# Patient Record
Sex: MALE | Race: WHITE | Employment: FULL TIME | URBAN - METROPOLITAN AREA
[De-identification: names, ages, dates, MRNs, and addresses within clinical notes are randomized per-mention and may not be internally consistent; named-entity substitution may affect disease eponyms.]

---

## 2019-03-03 ENCOUNTER — HOSPITAL ENCOUNTER (EMERGENCY)
Facility: HOSPITAL | Age: 54
Discharge: HOME | End: 2019-03-03
Attending: EMERGENCY MEDICINE
Payer: COMMERCIAL

## 2019-03-03 ENCOUNTER — APPOINTMENT (EMERGENCY)
Dept: CT IMAGING | Facility: HOSPITAL | Age: 54
End: 2019-03-03
Payer: COMMERCIAL

## 2019-03-03 VITALS
BODY MASS INDEX: 23.7 KG/M2 | TEMPERATURE: 98.4 F | RESPIRATION RATE: 16 BRPM | SYSTOLIC BLOOD PRESSURE: 125 MMHG | DIASTOLIC BLOOD PRESSURE: 76 MMHG | OXYGEN SATURATION: 99 % | WEIGHT: 160 LBS | HEIGHT: 69 IN | HEART RATE: 78 BPM

## 2019-03-03 DIAGNOSIS — S00.83XA CONTUSION OF FACE, INITIAL ENCOUNTER: Primary | ICD-10-CM

## 2019-03-03 PROCEDURE — 70450 CT HEAD/BRAIN W/O DYE: CPT

## 2019-03-03 PROCEDURE — 99284 EMERGENCY DEPT VISIT MOD MDM: CPT

## 2019-03-03 RX ORDER — VERAPAMIL HYDROCHLORIDE 40 MG/1
TABLET ORAL DAILY
COMMUNITY
Start: 2012-01-30 | End: 2019-03-05

## 2019-03-03 RX ORDER — NAPROXEN 500 MG/1
500 TABLET ORAL 2 TIMES DAILY PRN
Qty: 30 TABLET | Refills: 0 | Status: SHIPPED | OUTPATIENT
Start: 2019-03-03 | End: 2019-03-04

## 2019-03-03 RX ORDER — GINSENG 100 MG
1 CAPSULE ORAL ONCE
Status: COMPLETED | OUTPATIENT
Start: 2019-03-03 | End: 2019-03-03

## 2019-03-03 RX ORDER — ESOMEPRAZOLE MAGNESIUM 40 MG/1
CAPSULE, DELAYED RELEASE ORAL DAILY
COMMUNITY
Start: 2012-01-30 | End: 2019-03-04

## 2019-03-03 RX ORDER — RIZATRIPTAN BENZOATE 10 MG/1
TABLET ORAL
COMMUNITY
Start: 2005-08-23

## 2019-03-03 RX ADMIN — BACITRACIN ZINC 1 SMALL APPLICATION: 500 OINTMENT TOPICAL at 11:12
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
## 2019-03-04 ENCOUNTER — OFFICE VISIT (OUTPATIENT)
Dept: URGENT CARE | Facility: CLINIC | Age: 54
End: 2019-03-04
Payer: COMMERCIAL

## 2019-03-04 VITALS
BODY MASS INDEX: 25.76 KG/M2 | DIASTOLIC BLOOD PRESSURE: 82 MMHG | TEMPERATURE: 98.6 F | SYSTOLIC BLOOD PRESSURE: 130 MMHG | RESPIRATION RATE: 18 BRPM | HEIGHT: 68 IN | HEART RATE: 88 BPM | OXYGEN SATURATION: 97 % | WEIGHT: 170 LBS

## 2019-03-04 DIAGNOSIS — S06.0X9A CONCUSSION WITH LOSS OF CONSCIOUSNESS, INITIAL ENCOUNTER: Primary | ICD-10-CM

## 2019-03-04 PROCEDURE — 99213 OFFICE O/P EST LOW 20 MIN: CPT | Performed by: PHYSICIAN ASSISTANT

## 2019-03-04 RX ORDER — FAMOTIDINE 40 MG/1
40 TABLET, FILM COATED ORAL DAILY
COMMUNITY
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
## 2019-03-04 NOTE — PATIENT INSTRUCTIONS
Keep appointment for tomorrow with Dr Lenoora Kong at 10:15  Continue ibuprofen or Tylenol for discomfort  Drink plenty of water and get plenty of rest   Practice brain rest by limiting TV, phone, electronics, reading, and other brain stimuli  Try to dim or lower lights when possible  Apply ice to your face for 20-30 minutes at a time, as needed for swelling and discomfort  Continue to keep your skin clean using soap and water and apply antibiotic ointment to dry skin  Begin a B-complex vitamin and a fish oil  Proceed to the ER if symptoms worsen  Concussion   WHAT YOU NEED TO KNOW:   A concussion is a mild brain injury  It is usually caused by a bump or blow to the head from a fall, a motor vehicle crash, or a sports injury  Sometimes being shaken forcefully may cause a concussion  DISCHARGE INSTRUCTIONS:   Have someone else call 911 for the following:   · Someone tries to wake you and cannot do so  · You have a seizure, increasing confusion, or a change in personality  · Your speech becomes slurred, or you have new vision problems  Return to the emergency department if:   · You have a severe headache that does not go away  · You have arm or leg weakness, numbness, or new problems with coordination  · You have blood or clear fluid coming out of the ears or nose  Contact your healthcare provider if:   · You have nausea or are vomiting  · You feel more sleepy than usual     · Your symptoms get worse  · Your symptoms last longer than 6 weeks after the injury  · You have questions or concerns about your condition or care  Medicines:   · Acetaminophen  helps to decrease pain  It is available without a doctor's order  Ask how much to take and how often to take it  Follow directions  Acetaminophen can cause liver damage if not taken correctly  · NSAIDs , such as ibuprofen, help decrease swelling and pain  NSAIDs can cause stomach bleeding or kidney problems in certain people   If you take blood thinner medicine, always ask your healthcare provider if NSAIDs are safe for you  Always read the medicine label and follow directions  · Take your medicine as directed  Contact your healthcare provider if you think your medicine is not helping or if you have side effects  Tell him or her if you are allergic to any medicine  Keep a list of the medicines, vitamins, and herbs you take  Include the amounts, and when and why you take them  Bring the list or the pill bottles to follow-up visits  Carry your medicine list with you in case of an emergency  Follow up with your healthcare provider as directed:  Write down your questions so you remember to ask them during your visits  Self-care:   · Rest  from physical and mental activities as directed  Mental activities are those that require thinking, concentration, and attention  You will need to rest until your symptoms are gone  Rest will allow you to recover from your concussion  Ask your healthcare provider when you can return to work and other daily activities  · Have someone stay with you for the first 24 hours after your injury  Your healthcare provider should be contacted if your symptoms get worse, or you develop new symptoms  · Do not participate in sports and physical activities until your healthcare provider says it is okay  They could make your symptoms worse or lead to another concussion  Your healthcare provider will tell you when it is okay for you to return to sports or physical activities  Prevent another concussion:   · Wear protective sports equipment that fit properly  Helmets help decrease your risk of a serious brain injury  Talk to your healthcare provider about ways you can decrease your risk for a concussion if you play sports  · Wear your seat belt  every time you travel  This helps to decrease your risk of a head injury if you are in a car accident    © 2017 [employer] Information is for End User's use only and may not be sold, redistributed or otherwise used for commercial purposes  All illustrations and images included in CareNotes® are the copyrighted property of A [employer] M [employer] Inc  or Roque Mitchell  The above information is an  only  It is not intended as medical advice for individual conditions or treatments  Talk to your doctor, nurse or pharmacist before following any medical regimen to see if it is safe and effective for you

## 2019-03-05 VITALS
DIASTOLIC BLOOD PRESSURE: 76 MMHG | HEART RATE: 93 BPM | SYSTOLIC BLOOD PRESSURE: 138 MMHG | HEIGHT: 68 IN | BODY MASS INDEX: 23.64 KG/M2 | WEIGHT: 156 LBS

## 2019-03-05 DIAGNOSIS — S06.0X9A CONCUSSION WITH LOSS OF CONSCIOUSNESS, INITIAL ENCOUNTER: Primary | ICD-10-CM

## 2019-03-05 DIAGNOSIS — S00.81XA ABRASION OF FACE, INITIAL ENCOUNTER: ICD-10-CM

## 2019-03-05 PROCEDURE — 99203 OFFICE O/P NEW LOW 30 MIN: CPT | Performed by: ORTHOPAEDIC SURGERY

## 2019-03-05 RX ORDER — IBUPROFEN 200 MG
200 TABLET ORAL EVERY 6 HOURS PRN
COMMUNITY

## 2019-03-05 RX ORDER — ACETAMINOPHEN 325 MG/1
650 TABLET ORAL EVERY 6 HOURS PRN
COMMUNITY
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
## 2019-03-05 NOTE — LETTER
March 5, 2019     Patient: Elizabeth Lee   YOB: 1965   Date of Visit: 3/5/2019       To Whom it May Concern:    Elizabeth Lee is under my professional care  He was seen in my office on 3/5/2019  He should be out of work from 3/5/19 - 3/11/2019  If you have any questions or concerns, please don't hesitate to call           Sincerely,          Adia Welch, DO

## 2019-03-12 ENCOUNTER — OFFICE VISIT (OUTPATIENT)
Dept: OBGYN CLINIC | Facility: CLINIC | Age: 54
End: 2019-03-12
Payer: COMMERCIAL

## 2019-03-12 VITALS
SYSTOLIC BLOOD PRESSURE: 127 MMHG | HEART RATE: 84 BPM | DIASTOLIC BLOOD PRESSURE: 87 MMHG | HEIGHT: 67 IN | WEIGHT: 170 LBS | BODY MASS INDEX: 26.68 KG/M2

## 2019-03-12 DIAGNOSIS — S06.0X9D CONCUSSION WITH LOSS OF CONSCIOUSNESS, SUBSEQUENT ENCOUNTER: Primary | ICD-10-CM

## 2019-03-12 PROCEDURE — 99213 OFFICE O/P EST LOW 20 MIN: CPT | Performed by: ORTHOPAEDIC SURGERY

## 2019-03-12 NOTE — PROGRESS NOTES
Assessment/Plan:  No diagnosis found  ***    Subjective:   Emilia Paul is a 47 y o  male who presents ***      Review of Systems      Past Medical History:   Diagnosis Date    GERD (gastroesophageal reflux disease)     Migraine        Past Surgical History:   Procedure Laterality Date    APPENDECTOMY      HERNIA REPAIR Right     inguinal       History reviewed  No pertinent family history      Social History     Occupational History    Not on file   Tobacco Use    Smoking status: Former Smoker     Types: Cigarettes    Smokeless tobacco: Never Used   Substance and Sexual Activity    Alcohol use: Not Currently    Drug use: Never    Sexual activity: Not on file         Current Outpatient Medications:     acetaminophen (TYLENOL) 325 mg tablet, Take 650 mg by mouth every 6 (six) hours as needed for mild pain, Disp: , Rfl:     famotidine (PEPCID) 40 MG tablet, Take 40 mg by mouth daily, Disp: , Rfl:     ibuprofen (MOTRIN) 200 mg tablet, Take 200 mg by mouth every 6 (six) hours as needed for mild pain, Disp: , Rfl:     rizatriptan (MAXALT) 10 MG tablet, Take by mouth, Disp: , Rfl:     No Known Allergies    Objective:  Vitals:    03/12/19 1742   BP: 127/87   Pulse: 84       Ortho Exam    Physical Exam    I have personally reviewed pertinent films in PACS and my interpretation is as follows:  ***

## 2019-03-13 NOTE — PROGRESS NOTES
Assessment / Plan     1  Concussion with loss of consciousness, subsequent encounter         Henri Hernández has improving symptoms following his concussion  He seems only have a headache today and was feeling much better yesterday  Only being 1 week out from his injury, he seems to be doing very well and should have a short time table recovery  I advised him to gradually increase his exercise once he has 2 days of feeling completely normal   I advised against returning to skiing any time soon until he can ensure he is feeling much much better  It is dangerous to ski if he is off balance or having any visual symptoms whatsoever  He verbalized understanding of this  He should improve over the next week if he has ongoing symptoms he will follow up in the office only if needed  Subjective       Patient ID:  Lynnea Schwab is a 47 y o  male  who presents for follow-up for concussion suffered over 1 week ago after a ski injury  He did have elevated symptoms of a concussion last week  We advised to gradually return to work and other activity as tolerated  He returns today stating that he feels much better  He felt normal yesterday but does have a slight headache today after returning to work  He denies any fatigue or visual symptoms  He is eager to return to physical activity in skiing  Change in Symptoms:  Better  Explain:  Improved symptoms, only 1 symptom today  Back to School/work:  Back to work  Current Physical Activity:  None  Recent Grades / Tests: n/a  Subsequent Injuries:  No  Do symptoms worsen with Physical Activity? N/A  Do symptoms worsen with Cognitive Activity? Yes  Overall Rating:  What percent is this person back to normal?  Patient 90 %  Response to Meds:  N/A    Review of Systems   Constitutional: Negative for chills, fever and unexpected weight change  HENT: Negative for hearing loss, nosebleeds and sore throat  Eyes: Negative for pain, redness and visual disturbance  Respiratory: Negative for cough, shortness of breath and wheezing  Cardiovascular: Negative for chest pain, palpitations and leg swelling  Gastrointestinal: Negative for abdominal pain, nausea and vomiting  Endocrine: Negative for polyphagia and polyuria  Genitourinary: Negative for dysuria and hematuria  Musculoskeletal:        See HPI   Skin: Negative for rash and wound  Neurological: Positive for headaches  Negative for dizziness and numbness  Psychiatric/Behavioral: Negative for decreased concentration and suicidal ideas  The patient is not nervous/anxious  Past Medical History:   Diagnosis Date    GERD (gastroesophageal reflux disease)     Migraine        Past Surgical History:   Procedure Laterality Date    APPENDECTOMY      HERNIA REPAIR Right     inguinal       History reviewed  No pertinent family history  Social History     Occupational History    Not on file   Tobacco Use    Smoking status: Former Smoker     Types: Cigarettes    Smokeless tobacco: Never Used   Substance and Sexual Activity    Alcohol use: Not Currently    Drug use: Never    Sexual activity: Not on file         Current Outpatient Medications:     acetaminophen (TYLENOL) 325 mg tablet, Take 650 mg by mouth every 6 (six) hours as needed for mild pain, Disp: , Rfl:     famotidine (PEPCID) 40 MG tablet, Take 40 mg by mouth daily, Disp: , Rfl:     ibuprofen (MOTRIN) 200 mg tablet, Take 200 mg by mouth every 6 (six) hours as needed for mild pain, Disp: , Rfl:     rizatriptan (MAXALT) 10 MG tablet, Take by mouth, Disp: , Rfl:     No Known Allergies          Physical Exam     /87   Pulse 84   Ht 5' 7" (1 702 m)   Wt 77 1 kg (170 lb)   BMI 26 63 kg/m²     Objective:    Ortho Exam    Physical Exam   Constitutional: He is oriented to person, place, and time  He appears well-developed and well-nourished  HENT:   Head: Normocephalic and atraumatic     Eyes: Pupils are equal, round, and reactive to light  Conjunctivae are normal    Neck: Normal range of motion  Neck supple  Cardiovascular: Normal rate and intact distal pulses  Pulmonary/Chest: Effort normal  No respiratory distress  Musculoskeletal:   As noted in HPI   Neurological: He is alert and oriented to person, place, and time  Skin: Skin is warm and dry  Psychiatric: He has a normal mood and affect  His behavior is normal    Vitals reviewed        AAOX3:  Yes   Mood and Affect:  Normal  HEENT:   Lacerations:  No   Bruising:  No   PEERLA:  Yes   EOMI: Yes   Fracture/Trauma:  No  Neuro:   CNII - XII Intact:  Yes   Examination of Coordination:    Limited Balance:   Yes    Romberg:  normal    Forward Tandem Gait:  normal    Backward Tandem Gait:   normal    Eyes Close Tandem Gait:   abnormal        FTN: normal    Diadochokinesia: normal      Vestibular Ocular:     Gaze stability:    Nystagmus: no    Saccades: no/horizontal/vertical: normal    Vestibular Motion: normal    Convergence:  3cm

## 2021-12-13 ENCOUNTER — TELEPHONE (OUTPATIENT)
Dept: FAMILY MEDICINE CLINIC | Facility: CLINIC | Age: 56
End: 2021-12-13

## 2022-04-20 ENCOUNTER — OFFICE VISIT (OUTPATIENT)
Dept: URGENT CARE | Facility: CLINIC | Age: 57
End: 2022-04-20
Payer: COMMERCIAL

## 2022-04-20 VITALS — HEART RATE: 90 BPM | TEMPERATURE: 99.4 F | OXYGEN SATURATION: 98 %

## 2022-04-20 DIAGNOSIS — J06.9 UPPER RESPIRATORY TRACT INFECTION, UNSPECIFIED TYPE: Primary | ICD-10-CM

## 2022-04-20 DIAGNOSIS — Z20.828 CONTACT WITH AND (SUSPECTED) EXPOSURE TO OTHER VIRAL COMMUNICABLE DISEASES: ICD-10-CM

## 2022-04-20 PROCEDURE — 87636 SARSCOV2 & INF A&B AMP PRB: CPT | Performed by: PHYSICIAN ASSISTANT

## 2022-04-20 PROCEDURE — 99203 OFFICE O/P NEW LOW 30 MIN: CPT | Performed by: PHYSICIAN ASSISTANT

## 2022-04-21 ENCOUNTER — TELEPHONE (OUTPATIENT)
Dept: URGENT CARE | Facility: CLINIC | Age: 57
End: 2022-04-21

## 2022-04-21 DIAGNOSIS — J10.1 INFLUENZA A: Primary | ICD-10-CM

## 2022-04-21 LAB
FLUAV RNA RESP QL NAA+PROBE: POSITIVE
FLUBV RNA RESP QL NAA+PROBE: NEGATIVE
SARS-COV-2 RNA RESP QL NAA+PROBE: NEGATIVE

## 2022-04-21 RX ORDER — OSELTAMIVIR PHOSPHATE 75 MG/1
75 CAPSULE ORAL 2 TIMES DAILY
Qty: 10 CAPSULE | Refills: 0 | Status: SHIPPED | OUTPATIENT
Start: 2022-04-21 | End: 2022-04-26

## 2022-04-21 NOTE — PROGRESS NOTES
[facility]        NAME: Hui Maynard is a 62 y o  male  : 1965    MRN: [id number]  DATE: 2022  TIME: 8:20 PM    Assessment and Plan   Upper respiratory tract infection, unspecified type [J06 9]  1  Upper respiratory tract infection, unspecified type  Cov/Flu-Collected at [facility]   2  Contact with and (suspected) exposure to other viral communicable diseases  Cov/Flu-Collected at [facility] or [facility]     Continue Mucinex  Discussed viral nature of illness and lack of role for antibiotics at point  Discussed importance of maintaining adequate hydration advised to quarantine pending test results  Discussed strict return to care precautions as well as red flag symptoms which should prompt immediate ED referral  Pt verbalized understanding and is in agreement with plan  Please follow up with your primary care provider within the next week  Please remember that your visit today was with an urgent care provider and should not replace follow up with your primary care provider for chronic medical issues or annual physicals  Patient Instructions       Follow up with PCP in 3-5 days  Proceed to  ER if symptoms worsen  Chief Complaint     Chief Complaint   Patient presents with    Cold Like Symptoms     cough, congestion, fever 102F         History of Present Illness       Hui Maynard is a(n) 62 y o  male presenting with URI symptoms x 1 days  Past medical history: GERD, migraines  Congestion: yes  Sore throat: yes  Cough: yes  Sputum production: no  Fever: yes, tmax 102F yesterday  Body aches: yes  Loss of smell/taste: no  GI symptoms: no  Known sick contacts: no  OTC meds tried: mucinex with some improvement  Vaccinated against COVID19: no        Review of Systems   Review of Systems   Constitutional: Positive for chills, fatigue and fever  Negative for diaphoresis  HENT: Positive for congestion, postnasal drip and sore throat   Negative for ear pain, rhinorrhea, sinus pain, sneezing and trouble swallowing  Eyes: Negative for pain and redness  Respiratory: Positive for cough  Negative for chest tightness, shortness of breath and wheezing  Cardiovascular: Negative for chest pain and leg swelling  Gastrointestinal: Negative for diarrhea, nausea and vomiting  Musculoskeletal: Positive for myalgias  Neurological: Positive for headaches  Negative for dizziness and weakness  Current Medications       Current Outpatient Medications:     famotidine (PEPCID) 40 MG tablet, Take 40 mg by mouth daily, Disp: , Rfl:     rizatriptan (MAXALT) 10 MG tablet, Take by mouth, Disp: , Rfl:     acetaminophen (TYLENOL) 325 mg tablet, Take 650 mg by mouth every 6 (six) hours as needed for mild pain, Disp: , Rfl:     ibuprofen (MOTRIN) 200 mg tablet, Take 200 mg by mouth every 6 (six) hours as needed for mild pain, Disp: , Rfl:     Current Allergies     Allergies as of 04/20/2022    (No Known Allergies)            The following portions of the patient's history were reviewed and updated as appropriate: allergies, current medications, past family history, past medical history, past social history, past surgical history and problem list      Past Medical History:   Diagnosis Date    GERD (gastroesophageal reflux disease)     Migraine        Past Surgical History:   Procedure Laterality Date    APPENDECTOMY      HERNIA REPAIR Right     inguinal       History reviewed  No pertinent family history  Medications have been verified  Objective   Pulse 90   Temp 99 4 °F (37 4 °C)   SpO2 98%        Physical Exam     Physical Exam  Vitals and nursing note reviewed  Constitutional:       General: He is not in acute distress  Appearance: Normal appearance  He is not toxic-appearing  HENT:      Head: Normocephalic and atraumatic        Right Ear: Tympanic membrane, ear canal and external ear normal       Left Ear: Tympanic membrane, ear canal and external ear normal       Nose: Congestion present  Mouth/Throat:      Mouth: Mucous membranes are moist       Pharynx: Oropharynx is clear  No oropharyngeal exudate or posterior oropharyngeal erythema  Eyes:      Conjunctiva/sclera: Conjunctivae normal       Pupils: Pupils are equal, round, and reactive to light  Cardiovascular:      Rate and Rhythm: Normal rate and regular rhythm  Heart sounds: Normal heart sounds  Pulmonary:      Effort: Pulmonary effort is normal  No respiratory distress  Breath sounds: Normal breath sounds  No wheezing, rhonchi or rales  Abdominal:      General: Abdomen is flat  Palpations: Abdomen is soft  Musculoskeletal:      Cervical back: Normal range of motion and neck supple  Skin:     General: Skin is warm and dry  Capillary Refill: Capillary refill takes less than 2 seconds  Neurological:      Mental Status: He is alert and oriented to person, place, and time     Psychiatric:         Behavior: Behavior normal

## 2022-04-21 NOTE — TELEPHONE ENCOUNTER
Pt tested positive for influenza A - less than 48 hrs since symptom onset, still qualifies for Tamiflu  Pt states he is feeling a little better but still harsh cough  Will call in rx now  Discussed rtc precautions with pt

## 2024-04-21 ENCOUNTER — OFFICE VISIT (OUTPATIENT)
Dept: URGENT CARE | Facility: CLINIC | Age: 59
End: 2024-04-21
Payer: COMMERCIAL

## 2024-04-21 ENCOUNTER — APPOINTMENT (OUTPATIENT)
Dept: RADIOLOGY | Facility: CLINIC | Age: 59
End: 2024-04-21
Payer: COMMERCIAL

## 2024-04-21 VITALS
TEMPERATURE: 98.2 F | OXYGEN SATURATION: 99 % | DIASTOLIC BLOOD PRESSURE: 86 MMHG | BODY MASS INDEX: 25.03 KG/M2 | WEIGHT: 169 LBS | RESPIRATION RATE: 16 BRPM | HEIGHT: 69 IN | HEART RATE: 74 BPM | SYSTOLIC BLOOD PRESSURE: 129 MMHG

## 2024-04-21 DIAGNOSIS — M54.2 NECK PAIN: Primary | ICD-10-CM

## 2024-04-21 DIAGNOSIS — S46.812A TRAPEZIUS STRAIN, LEFT, INITIAL ENCOUNTER: ICD-10-CM

## 2024-04-21 PROCEDURE — 72050 X-RAY EXAM NECK SPINE 4/5VWS: CPT

## 2024-04-21 PROCEDURE — 99203 OFFICE O/P NEW LOW 30 MIN: CPT | Performed by: PHYSICIAN ASSISTANT

## 2024-04-21 PROCEDURE — 73030 X-RAY EXAM OF SHOULDER: CPT

## 2024-04-21 RX ORDER — CYCLOBENZAPRINE HCL 10 MG
10 TABLET ORAL DAILY
COMMUNITY
Start: 2024-04-20 | End: 2024-04-30

## 2024-04-21 RX ORDER — NAPROXEN 500 MG/1
500 TABLET ORAL 2 TIMES DAILY
COMMUNITY
Start: 2024-04-20 | End: 2024-04-30

## 2024-04-21 NOTE — PROGRESS NOTES
[facility]        NAME: Joaquin Dial is a 59 y.o. male  : 1965    MRN: [id number]  DATE: 2024  TIME: 2:48 PM    Assessment and Plan   Neck pain [M54.2]  1. Neck pain  XR spine cervical complete 4 or 5 vw non injury    XR spine cervical complete 4 or 5 vw non injury    Ambulatory Referral to Orthopedic Surgery      2. Trapezius strain, left, initial encounter  XR shoulder 2+ vw left    XR shoulder 2+ vw left    Ambulatory Referral to Orthopedic Surgery        XR with no acute osseous abnormality per my preliminary read, pending official radiology report.   Pt educated on continuing supportive care and stretching. Pt given ambulatory referral to orthopedic surgery for further evaluation. Pt educated on red flags and when to go to the ER.   Discussed strict return to care precautions as well as red flag symptoms which should prompt immediate ED referral. Pt verbalized understanding and is in agreement with plan.  Please follow up with your primary care provider within the next week. Please remember that your visit today was with an urgent care provider and should not replace follow up with your primary care provider for chronic medical issues or annual physicals.       Patient Instructions       Follow up with PCP in 3-5 days.  Proceed to  ER if symptoms worsen.    Chief Complaint     Chief Complaint   Patient presents with    Pain     Pt states he has a pain in his left lower neck and shoulder going down his arm. Was seen at urgent care yesterday and was given flexeril and naproxen. Wants an xray to go see chiropractor.          History of Present Illness       Joaquin is a 59 year old male with a PMH of migraines and GERD presenting with neck pain radiating to the L arm x 4-5 days. Pt reports it started suddenly without any noticeable provoking factors. Pt was seen and evaluated at an urgent care yesterday where he had a normal EKG. Pt reports the pain started with neck  stiffness/soreness and is now medial to the scapula and radiated to the L whole left arm. Pt denies any cp, sob, numbness, tingling, n/v/d/c or weakness. Pt tried ice which seemed to make it worse, as well as ibuprofen without relief. Pt took the flexiril and naproxen given to him from the urgent care yesterday without relief. Pt reports similar prior episodes in the past which have been relieved with PT and chiropractic care. 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 States he thinks it may be exacerbated from sleeping in awkward positions due to his GERD. States he is here today mainly for Xrs so his chiropractor will work on him.        Review of Systems   Review of Systems   Constitutional:  Negative for chills, fatigue, fever and unexpected weight change.   HENT:  Negative for rhinorrhea and sore throat.    Eyes:  Negative for visual disturbance.   Respiratory:  Negative for cough, chest tightness and shortness of breath.    Cardiovascular:  Negative for chest pain and palpitations.   Gastrointestinal:  Negative for abdominal distention, constipation, diarrhea, nausea and vomiting.   Genitourinary:  Negative for difficulty urinating.   Musculoskeletal:  Positive for back pain and myalgias.   Skin:  Negative for rash.   Neurological:  Negative for weakness, light-headedness and numbness.         Current Medications       Current Outpatient Medications:     acetaminophen (TYLENOL) 325 mg tablet, Take 650 mg by mouth every 6 (six) hours as needed for mild pain, Disp: , Rfl:     cyclobenzaprine (FLEXERIL) 10 mg tablet, Take 10 mg by mouth daily, Disp: , Rfl:     famotidine (PEPCID) 40 MG tablet, Take 40 mg by mouth daily, Disp: , Rfl:     ibuprofen (MOTRIN) 200 mg tablet, Take 200 mg by mouth every 6 (six) hours as needed for mild pain, Disp: , Rfl:     naproxen (NAPROSYN) 500 mg tablet, Take 500 mg by mouth 2 (two) times a day, Disp: , Rfl:     rizatriptan  "(MAXALT) 10 MG tablet, Take by mouth, Disp: , Rfl:     Current Allergies     Allergies as of 04/21/2024    (No Known Allergies)            The following portions of the patient's history were reviewed and updated as appropriate: allergies, current medications, past family history, past medical history, past social history, past surgical history and problem list.     Past Medical History:   Diagnosis Date    GERD (gastroesophageal reflux disease)     Migraine        Past Surgical History:   Procedure Laterality Date    APPENDECTOMY      HERNIA REPAIR Right     inguinal       History reviewed. No pertinent family history.      Medications have been verified.        Objective   /86   Pulse 74   Temp 98.2 °F (36.8 °C)   Resp 16   Ht 5' 9\" (1.753 m)   Wt 76.7 kg (169 lb)   SpO2 99%   BMI 24.96 kg/m²        Physical Exam     Physical Exam  Vitals and nursing note reviewed.   Constitutional:       General: He is not in acute distress.     Appearance: Normal appearance. He is not ill-appearing or toxic-appearing.   HENT:      Head: Normocephalic and atraumatic.      Nose: No congestion.      Mouth/Throat:      Mouth: Mucous membranes are moist.   Eyes:      Extraocular Movements: Extraocular movements intact.      Pupils: Pupils are equal, round, and reactive to light.   Cardiovascular:      Rate and Rhythm: Normal rate and regular rhythm.      Pulses: Normal pulses.      Heart sounds: Normal heart sounds.   Pulmonary:      Effort: Pulmonary effort is normal. No respiratory distress.      Breath sounds: Normal breath sounds.   Musculoskeletal:         General: No swelling or signs of injury.      Right shoulder: Tenderness (trapezius tender and palpable spasm) present. No swelling. Normal range of motion. Normal strength. Normal pulse.      Left shoulder: Normal. No swelling or tenderness. Normal range of motion. Normal strength. Normal pulse.      Right upper arm: Normal.      Left upper arm: Normal.      " Right elbow: Normal.      Left elbow: Normal.      Right forearm: Normal.      Left forearm: Normal.      Right wrist: Normal.      Left wrist: Normal.      Right hand: Normal. Normal pulse.      Left hand: Normal. Normal pulse.      Cervical back: No tenderness. No pain with movement or spinous process tenderness. Decreased range of motion (slightly decreased abduction bilaterally secondary to discomfort).   Skin:     General: Skin is warm and dry.      Capillary Refill: Capillary refill takes less than 2 seconds.   Neurological:      Mental Status: He is alert and oriented to person, place, and time.   Psychiatric:         Behavior: Behavior normal.

## 2024-04-23 ENCOUNTER — TELEPHONE (OUTPATIENT)
Dept: URGENT CARE | Facility: CLINIC | Age: 59
End: 2024-04-23

## 2024-04-23 DIAGNOSIS — S46.812A TRAPEZIUS STRAIN, LEFT, INITIAL ENCOUNTER: Primary | ICD-10-CM

## 2024-04-23 DIAGNOSIS — M54.2 NECK PAIN: ICD-10-CM

## 2024-04-23 NOTE — TELEPHONE ENCOUNTER
Pt called for test results. Educated that xray results were all negative. If pain continues to follow up with orthopedics as planned. Noted orthopedics referral still pending.